# Patient Record
Sex: MALE | Race: OTHER | NOT HISPANIC OR LATINO | Employment: FULL TIME | URBAN - METROPOLITAN AREA
[De-identification: names, ages, dates, MRNs, and addresses within clinical notes are randomized per-mention and may not be internally consistent; named-entity substitution may affect disease eponyms.]

---

## 2021-08-02 PROCEDURE — 99284 EMERGENCY DEPT VISIT MOD MDM: CPT

## 2021-08-03 ENCOUNTER — HOSPITAL ENCOUNTER (EMERGENCY)
Facility: HOSPITAL | Age: 36
Discharge: HOME | End: 2021-08-03
Attending: EMERGENCY MEDICINE
Payer: COMMERCIAL

## 2021-08-03 VITALS
SYSTOLIC BLOOD PRESSURE: 127 MMHG | WEIGHT: 145 LBS | DIASTOLIC BLOOD PRESSURE: 77 MMHG | HEART RATE: 75 BPM | BODY MASS INDEX: 22.76 KG/M2 | RESPIRATION RATE: 18 BRPM | OXYGEN SATURATION: 99 % | TEMPERATURE: 98.4 F | HEIGHT: 67 IN

## 2021-08-03 DIAGNOSIS — R14.0 BLOATING: Primary | ICD-10-CM

## 2021-08-03 DIAGNOSIS — R19.5 DARK STOOLS: ICD-10-CM

## 2021-08-03 DIAGNOSIS — K92.1 BLOOD IN STOOL: ICD-10-CM

## 2021-08-03 LAB
ALBUMIN SERPL BCP-MCNC: 4.4 G/DL (ref 3.5–5)
ALP SERPL-CCNC: 79 U/L (ref 46–116)
ALT SERPL W P-5'-P-CCNC: 27 U/L (ref 12–78)
ANION GAP SERPL CALCULATED.3IONS-SCNC: 11 MMOL/L (ref 4–13)
AST SERPL W P-5'-P-CCNC: 16 U/L (ref 5–45)
BASOPHILS # BLD AUTO: 0.08 THOUSANDS/ΜL (ref 0–0.1)
BASOPHILS NFR BLD AUTO: 1 % (ref 0–1)
BILIRUB SERPL-MCNC: 0.48 MG/DL (ref 0.2–1)
BILIRUB UR QL STRIP: NEGATIVE
BUN SERPL-MCNC: 13 MG/DL (ref 5–25)
CALCIUM SERPL-MCNC: 8.8 MG/DL (ref 8.3–10.1)
CHLORIDE SERPL-SCNC: 104 MMOL/L (ref 100–108)
CLARITY UR: CLEAR
CO2 SERPL-SCNC: 29 MMOL/L (ref 21–32)
COLOR UR: YELLOW
CREAT SERPL-MCNC: 0.8 MG/DL (ref 0.6–1.3)
EOSINOPHIL # BLD AUTO: 0.3 THOUSAND/ΜL (ref 0–0.61)
EOSINOPHIL NFR BLD AUTO: 3 % (ref 0–6)
ERYTHROCYTE [DISTWIDTH] IN BLOOD BY AUTOMATED COUNT: 12.6 % (ref 11.6–15.1)
GFR SERPL CREATININE-BSD FRML MDRD: 115 ML/MIN/1.73SQ M
GLUCOSE SERPL-MCNC: 100 MG/DL (ref 65–140)
GLUCOSE UR STRIP-MCNC: NEGATIVE MG/DL
HCT VFR BLD AUTO: 45.3 % (ref 36.5–49.3)
HGB BLD-MCNC: 15.1 G/DL (ref 12–17)
HGB UR QL STRIP.AUTO: NEGATIVE
IMM GRANULOCYTES # BLD AUTO: 0.03 THOUSAND/UL (ref 0–0.2)
IMM GRANULOCYTES NFR BLD AUTO: 0 % (ref 0–2)
KETONES UR STRIP-MCNC: NEGATIVE MG/DL
LEUKOCYTE ESTERASE UR QL STRIP: NEGATIVE
LIPASE SERPL-CCNC: 311 U/L (ref 73–393)
LYMPHOCYTES # BLD AUTO: 4.18 THOUSANDS/ΜL (ref 0.6–4.47)
LYMPHOCYTES NFR BLD AUTO: 36 % (ref 14–44)
MCH RBC QN AUTO: 28.9 PG (ref 26.8–34.3)
MCHC RBC AUTO-ENTMCNC: 33.3 G/DL (ref 31.4–37.4)
MCV RBC AUTO: 87 FL (ref 82–98)
MONOCYTES # BLD AUTO: 1.01 THOUSAND/ΜL (ref 0.17–1.22)
MONOCYTES NFR BLD AUTO: 9 % (ref 4–12)
NEUTROPHILS # BLD AUTO: 5.89 THOUSANDS/ΜL (ref 1.85–7.62)
NEUTS SEG NFR BLD AUTO: 51 % (ref 43–75)
NITRITE UR QL STRIP: NEGATIVE
NRBC BLD AUTO-RTO: 0 /100 WBCS
PH UR STRIP.AUTO: 6 [PH]
PLATELET # BLD AUTO: 284 THOUSANDS/UL (ref 149–390)
PMV BLD AUTO: 11.3 FL (ref 8.9–12.7)
POTASSIUM SERPL-SCNC: 3.8 MMOL/L (ref 3.5–5.3)
PROT SERPL-MCNC: 7.9 G/DL (ref 6.4–8.2)
PROT UR STRIP-MCNC: NEGATIVE MG/DL
RBC # BLD AUTO: 5.22 MILLION/UL (ref 3.88–5.62)
SODIUM SERPL-SCNC: 144 MMOL/L (ref 136–145)
SP GR UR STRIP.AUTO: 1.02 (ref 1–1.03)
UROBILINOGEN UR QL STRIP.AUTO: 0.2 E.U./DL
WBC # BLD AUTO: 11.49 THOUSAND/UL (ref 4.31–10.16)

## 2021-08-03 PROCEDURE — 36415 COLL VENOUS BLD VENIPUNCTURE: CPT | Performed by: EMERGENCY MEDICINE

## 2021-08-03 PROCEDURE — 96360 HYDRATION IV INFUSION INIT: CPT

## 2021-08-03 PROCEDURE — 80053 COMPREHEN METABOLIC PANEL: CPT | Performed by: EMERGENCY MEDICINE

## 2021-08-03 PROCEDURE — 85025 COMPLETE CBC W/AUTO DIFF WBC: CPT | Performed by: EMERGENCY MEDICINE

## 2021-08-03 PROCEDURE — 99284 EMERGENCY DEPT VISIT MOD MDM: CPT | Performed by: EMERGENCY MEDICINE

## 2021-08-03 PROCEDURE — 81003 URINALYSIS AUTO W/O SCOPE: CPT | Performed by: EMERGENCY MEDICINE

## 2021-08-03 PROCEDURE — 83690 ASSAY OF LIPASE: CPT | Performed by: EMERGENCY MEDICINE

## 2021-08-03 RX ADMIN — SODIUM CHLORIDE 1000 ML: 0.9 INJECTION, SOLUTION INTRAVENOUS at 01:53

## 2021-08-03 NOTE — Clinical Note
Jennifer Lydia was seen and treated in our emergency department on 8/2/2021  Diagnosis: blood in stool    [place]    He may return on this date: 08/05/2021         If you have any questions or concerns, please don't hesitate to call        Ben Strange MD    ______________________________           _______________          _______________  [facility] [place]                              Date                                Time

## 2021-08-03 NOTE — ED PROVIDER NOTES
History  Chief Complaint   Patient presents with    Abdominal Pain     left sided abd pain x 3 wks, diarrhea, black stools      HPI    39 yo M otherwise healthy presents to ed for eval blood in his stools  Started three weeks ago  Patient states this has never happened before  He denies abd pain  Patient states he had feeling of gas in his stomachache, specks of blood in stool intermittently  Started turning black three days ago  Stool is now only coming once per day, solid, and black  Eating pizza, regular foods per patient  He has some intermittent loss of appetite  No fevers  No lightheadedness  No n/v  No other complaints on ROS  Tried pepto three days and stools dark since then  None       No past medical history on file  No past surgical history on file  No family history on file  I have reviewed and agree with the history as documented  No existing history information found  No existing history information found  Social History     Tobacco Use    Smoking status: Not on file   Substance Use Topics    Alcohol use: Not on file    Drug use: Not on file       Review of Systems   Constitutional: Negative for chills, fatigue and fever  HENT: Negative for nosebleeds and sore throat  Eyes: Negative for redness and visual disturbance  Respiratory: Negative for shortness of breath and wheezing  Cardiovascular: Negative for chest pain and palpitations  Gastrointestinal: Positive for blood in stool and diarrhea  Negative for abdominal pain  Endocrine: Negative for polyuria  Genitourinary: Negative for difficulty urinating and testicular pain  Musculoskeletal: Negative for back pain and neck stiffness  Skin: Negative for rash and wound  Neurological: Negative for seizures, speech difficulty and headaches  Psychiatric/Behavioral: Negative for dysphoric mood and hallucinations  All other systems reviewed and are negative        Physical Exam  Physical Exam  Vitals and nursing note reviewed  Constitutional:       Appearance: He is well-developed  HENT:      Head: Normocephalic and atraumatic  Right Ear: External ear normal       Left Ear: External ear normal    Eyes:      Conjunctiva/sclera: Conjunctivae normal    Cardiovascular:      Rate and Rhythm: Normal rate and regular rhythm  Heart sounds: Normal heart sounds  Pulmonary:      Effort: Pulmonary effort is normal       Breath sounds: Normal breath sounds  No wheezing  Chest:      Chest wall: No tenderness  Abdominal:      General: Bowel sounds are normal       Palpations: Abdomen is soft  Tenderness: There is no abdominal tenderness  There is no guarding  Musculoskeletal:         General: Normal range of motion  Cervical back: Normal range of motion  Skin:     General: Skin is warm and dry  Findings: No rash  Neurological:      Mental Status: He is alert and oriented to person, place, and time  Cranial Nerves: No cranial nerve deficit  Sensory: No sensory deficit  Motor: No abnormal muscle tone        Coordination: Coordination normal          Vital Signs  ED Triage Vitals [08/02/21 2300]   Temperature Pulse Respirations Blood Pressure SpO2   98 4 °F (36 9 °C) 81 18 128/86 97 %      Temp Source Heart Rate Source Patient Position - Orthostatic VS BP Location FiO2 (%)   Oral Monitor Sitting Left arm --      Pain Score       --           Vitals:    08/02/21 2300 08/03/21 0313 08/03/21 0315   BP: 128/86  127/77   Pulse: 81 73 75   Patient Position - Orthostatic VS: Sitting           Visual Acuity      ED Medications  Medications   sodium chloride 0 9 % bolus 1,000 mL (0 mL Intravenous Stopped 8/3/21 0253)       Diagnostic Studies  Results Reviewed     Procedure Component Value Units Date/Time    Comprehensive metabolic panel [[id number]] Collected: 08/03/21 0151    Lab Status: Final result Specimen: Blood from Arm, Left Updated: 08/03/21 4875     Sodium 144 mmol/L      Potassium 3 8 mmol/L      Chloride 104 mmol/L      CO2 29 mmol/L      ANION GAP 11 mmol/L      BUN 13 mg/dL      Creatinine 0 80 mg/dL      Glucose 100 mg/dL      Calcium 8 8 mg/dL      AST 16 U/L      ALT 27 U/L      Alkaline Phosphatase 79 U/L      Total Protein 7 9 g/dL      Albumin 4 4 g/dL      Total Bilirubin 0 48 mg/dL      eGFR 115 ml/min/1 73sq m     Narrative:      National Kidney Disease Foundation guidelines for Chronic Kidney Disease (CKD):     Stage 1 with normal or high GFR (GFR > 90 mL/min/1 73 square meters)    Stage 2 Mild CKD (GFR = 60-89 mL/min/1 73 square meters)    Stage 3A Moderate CKD (GFR = 45-59 mL/min/1 73 square meters)    Stage 3B Moderate CKD (GFR = 30-44 mL/min/1 73 square meters)    Stage 4 Severe CKD (GFR = 15-29 mL/min/1 73 square meters)    Stage 5 End Stage CKD (GFR <15 mL/min/1 73 square meters)  Note: GFR calculation is accurate only with a steady state creatinine    Lipase [[id number]]  (Normal) Collected: 08/03/21 0151    Lab Status: Final result Specimen: Blood from Arm, Left Updated: 08/03/21 0213     Lipase 311 u/L     UA (URINE) with reflex to Scope [[id number]] Collected: 08/03/21 0151    Lab Status: Final result Specimen: Urine, Clean Catch Updated: 08/03/21 0200     Color, UA Yellow     Clarity, UA Clear     Specific Gravity, UA 1 025     pH, UA 6 0     Leukocytes, UA Negative     Nitrite, UA Negative     Protein, UA Negative mg/dl      Glucose, UA Negative mg/dl      Ketones, UA Negative mg/dl      Urobilinogen, UA 0 2 E U /dl      Bilirubin, UA Negative     Blood, UA Negative    CBC and differential [[id number]]  (Abnormal) Collected: 08/03/21 0151    Lab Status: Final result Specimen: Blood from Arm, Left Updated: 08/03/21 0159     WBC 11 49 Thousand/uL      RBC 5 22 Million/uL      Hemoglobin 15 1 g/dL      Hematocrit 45 3 %      MCV 87 fL      MCH 28 9 pg      MCHC 33 3 g/dL      RDW 12 6 %      MPV 11 3 fL      Platelets 948 Thousands/uL      nRBC 0 /100 WBCs Neutrophils Relative 51 %      Immat GRANS % 0 %      Lymphocytes Relative 36 %      Monocytes Relative 9 %      Eosinophils Relative 3 %      Basophils Relative 1 %      Neutrophils Absolute 5 89 Thousands/µL      Immature Grans Absolute 0 03 Thousand/uL      Lymphocytes Absolute 4 18 Thousands/µL      Monocytes Absolute 1 01 Thousand/µL      Eosinophils Absolute 0 30 Thousand/µL      Basophils Absolute 0 08 Thousands/µL                  No orders to display              Procedures  Procedures         ED Course               MDM    27-year-old male who presents to the ED for evaluation of possible blood in his stool  The patient has no blood currently, previous had diarrhea, now resolved, the patient going once per day  The patient has no abdominal pain  CBC within normal limits, signed out pending electrolytes and lipase  Electrolytes within normal limits, on chart review, discharged home with gi and pcp follow up  The patient was instructed to follow up as documented  Strict return precautions were discussed with the patient and the patient was instructed to return to the emergency department immediately if symptoms worsen  The patient/patient family member acknowledged and were in agreement with plan  Disposition  Final diagnoses:   Bloating   Blood in stool   Dark stools     Time reflects when diagnosis was documented in both MDM as applicable and the Disposition within this note     Time User Action Codes Description Comment    8/3/2021  2:06 AM Roanne Shawn Add [R14 0] Bloating     8/3/2021  2:06 AM Roanne Shawn Add [K92 1] Blood in stool     8/3/2021  2:06 AM Roanne Shawn Add [R19 5] Dark stools       ED Disposition     ED Disposition Condition Date/Time Comment    Discharge Stable Tue Aug 3, 2021  2:06 AM Carlos Gil discharge to home/self care              Follow-up Information     Follow up With Specialties Details Why Contact Info Additional Kiana Demarco MD Internal Medicine Schedule an appointment as soon as possible for a visit  To find a primary care doctor, For follow up regarding your symptoms and recheck [address] [facility] Schedule an appointment as soon as possible for a visit in 1 day For follow up regarding your symptoms and recheck [address] [place] [phone] [address] [facility], [address] [facility], [facility], [place], [place], [address] [place] [address]          There are no discharge medications for this patient  No discharge procedures on file      PDMP Review     None          ED Provider  Electronically Signed by           Esteban Andre MD  08/03/21 4635

## 2021-08-25 ENCOUNTER — OFFICE VISIT (OUTPATIENT)
Dept: GASTROENTEROLOGY | Facility: CLINIC | Age: 36
End: 2021-08-25
Payer: COMMERCIAL

## 2021-08-25 VITALS
HEART RATE: 86 BPM | HEIGHT: 67 IN | BODY MASS INDEX: 24.8 KG/M2 | DIASTOLIC BLOOD PRESSURE: 68 MMHG | WEIGHT: 158 LBS | SYSTOLIC BLOOD PRESSURE: 102 MMHG

## 2021-08-25 DIAGNOSIS — K62.5 RECTAL BLEEDING: Primary | ICD-10-CM

## 2021-08-25 PROCEDURE — 99204 OFFICE O/P NEW MOD 45 MIN: CPT | Performed by: PHYSICIAN ASSISTANT

## 2021-08-25 NOTE — PROGRESS NOTES
[facility] - Outpatient Consultation  Brandyn Davila 38 y o  male MRN: [id number]  Encounter: [id number]          ASSESSMENT AND PLAN:      1  Rectal bleeding    Patient reports of intermittent rectal bleeding this month  He also reports of a few weeks of diarrhea and left sided abdominal bloating which has resolved  Will plan for colonoscopy to investigate   ______________________________________________________________________    HPI:  Patient is a 38year old male who presents to the office for an evaluation of rectal bleeding  Patient reports noticing intermittent rectal bleeding this month  He also reports he developed diarrhea and left sided abdominal bloating at the beginning of this month which lasted almost 3 weeks and has resolved  No nausea or vomiting  No sick contacts with similar symptoms  He denies black stools with the exception of black stools when he took Pepto bismuth for his diarrhea  He has never had a colonoscopy  No family history of ulcerative colitis, crohn's disease, or colon cancer  REVIEW OF SYSTEMS:    CONSTITUTIONAL: Denies any fever, chills, rigors  HEENT: No earache or tinnitus  Denies hearing loss or visual disturbances  CARDIOVASCULAR: No chest pain or palpitations  RESPIRATORY: Denies any cough, hemoptysis, shortness of breath or dyspnea on exertion  GASTROINTESTINAL: As noted in the History of Present Illness  GENITOURINARY: No problems with urination  Denies any hematuria or dysuria  NEUROLOGIC: No dizziness or vertigo, denies headaches  MUSCULOSKELETAL: Denies any muscle or joint pain  SKIN: Denies skin rashes or itching  ENDOCRINE: Denies excessive thirst  Denies intolerance to heat or cold  PSYCHOSOCIAL: Denies depression or anxiety  Denies any recent memory loss  Historical Information   History reviewed  No pertinent past medical history  History reviewed  No pertinent surgical history    Social History Social History     Substance and Sexual Activity   Alcohol Use Not Currently    Comment: daily     Social History     Substance and Sexual Activity   Drug Use Yes    Comment: CBD oils     Social History     Tobacco Use   Smoking Status Current Every Day Smoker   Smokeless Tobacco Never Used     History reviewed  No pertinent family history  Meds/Allergies     No current outpatient medications on file  No Known Allergies        Objective     Blood pressure 102/68, pulse 86, height 5' 7" (1 702 m), weight 71 7 kg (158 lb)  Body mass index is 24 75 kg/m²  PHYSICAL EXAM:      General Appearance:   Alert, cooperative, no distress   HEENT:   Normocephalic, atraumatic, anicteric    Neck:  Supple, symmetrical, trachea midline   Lungs:   Clear to auscultation bilaterally; no rales, rhonchi or wheezing; respirations unlabored    Heart[de-identified]   Regular rate and rhythm; no murmur, rub, or gallop  Abdomen:   Soft, non-tender, non-distended; normal bowel sounds; no masses, no organomegaly    Genitalia:   Deferred    Rectal:   Deferred    Extremities:  No cyanosis, clubbing or edema    Pulses:  2+ and symmetric    Skin:  No jaundice, rashes, or lesions    Lymph nodes:  No palpable cervical lymphadenopathy        Lab Results:   No visits with results within 1 Day(s) from this visit     Latest known visit with results is:   Admission on 08/03/2021, Discharged on 08/03/2021   Component Date Value    WBC 08/03/2021 11 49*    RBC 08/03/2021 5 22     Hemoglobin 08/03/2021 15 1     Hematocrit 08/03/2021 45 3     MCV 08/03/2021 87     MCH 08/03/2021 28 9     MCHC 08/03/2021 33 3     RDW 08/03/2021 12 6     MPV 08/03/2021 11 3     Platelets 89/60/7803 284     nRBC 08/03/2021 0     Neutrophils Relative 08/03/2021 51     Immat GRANS % 08/03/2021 0     Lymphocytes Relative 08/03/2021 36     Monocytes Relative 08/03/2021 9     Eosinophils Relative 08/03/2021 3     Basophils Relative 08/03/2021 1     Neutrophils Absolute 08/03/2021 5 89     Immature Grans Absolute 08/03/2021 0 03     Lymphocytes Absolute 08/03/2021 4 18     Monocytes Absolute 08/03/2021 1 01     Eosinophils Absolute 08/03/2021 0 30     Basophils Absolute 08/03/2021 0 08     Sodium 08/03/2021 144     Potassium 08/03/2021 3 8     Chloride 08/03/2021 104     CO2 08/03/2021 29     ANION GAP 08/03/2021 11     BUN 08/03/2021 13     Creatinine 08/03/2021 0 80     Glucose 08/03/2021 100     Calcium 08/03/2021 8 8     AST 08/03/2021 16     ALT 08/03/2021 27     Alkaline Phosphatase 08/03/2021 79     Total Protein 08/03/2021 7 9     Albumin 08/03/2021 4 4     Total Bilirubin 08/03/2021 0 48     eGFR 08/03/2021 115     Color, UA 08/03/2021 Yellow     Clarity, UA 08/03/2021 Clear     Specific Gravity, UA 08/03/2021 1 025     pH, UA 08/03/2021 6 0     Leukocytes, UA 08/03/2021 Negative     Nitrite, UA 08/03/2021 Negative     Protein, UA 08/03/2021 Negative     Glucose, UA 08/03/2021 Negative     Ketones, UA 08/03/2021 Negative     Urobilinogen, UA 08/03/2021 0 2     Bilirubin, UA 08/03/2021 Negative     Blood, UA 08/03/2021 Negative     Lipase 08/03/2021 311          Radiology Results:   No results found

## 2021-10-01 ENCOUNTER — TELEPHONE (OUTPATIENT)
Dept: GASTROENTEROLOGY | Facility: HOSPITAL | Age: 36
End: 2021-10-01

## 2022-11-10 ENCOUNTER — HOSPITAL ENCOUNTER (EMERGENCY)
Facility: HOSPITAL | Age: 37
Discharge: HOME | End: 2022-11-10
Attending: EMERGENCY MEDICINE

## 2022-11-10 ENCOUNTER — APPOINTMENT (OUTPATIENT)
Dept: RADIOLOGY | Facility: HOSPITAL | Age: 37
End: 2022-11-10

## 2022-11-10 VITALS
RESPIRATION RATE: 18 BRPM | OXYGEN SATURATION: 98 % | TEMPERATURE: 98 F | HEART RATE: 73 BPM | SYSTOLIC BLOOD PRESSURE: 142 MMHG | DIASTOLIC BLOOD PRESSURE: 94 MMHG

## 2022-11-10 DIAGNOSIS — R07.9 CHEST PAIN: Primary | ICD-10-CM

## 2022-11-10 LAB
ALBUMIN SERPL BCP-MCNC: 4.4 G/DL (ref 3.5–5)
ALP SERPL-CCNC: 87 U/L (ref 46–116)
ALT SERPL W P-5'-P-CCNC: 31 U/L (ref 12–78)
ANION GAP SERPL CALCULATED.3IONS-SCNC: 10 MMOL/L (ref 4–13)
AST SERPL W P-5'-P-CCNC: 23 U/L (ref 5–45)
ATRIAL RATE: 88 BPM
BASOPHILS # BLD AUTO: 0.07 THOUSANDS/ÂΜL (ref 0–0.1)
BASOPHILS NFR BLD AUTO: 1 % (ref 0–1)
BILIRUB SERPL-MCNC: 0.6 MG/DL (ref 0.2–1)
BUN SERPL-MCNC: 13 MG/DL (ref 5–25)
CALCIUM SERPL-MCNC: 9.4 MG/DL (ref 8.3–10.1)
CARDIAC TROPONIN I PNL SERPL HS: 2 NG/L
CHLORIDE SERPL-SCNC: 106 MMOL/L (ref 96–108)
CO2 SERPL-SCNC: 24 MMOL/L (ref 21–32)
CREAT SERPL-MCNC: 0.77 MG/DL (ref 0.6–1.3)
EOSINOPHIL # BLD AUTO: 0.1 THOUSAND/ÂΜL (ref 0–0.61)
EOSINOPHIL NFR BLD AUTO: 1 % (ref 0–6)
ERYTHROCYTE [DISTWIDTH] IN BLOOD BY AUTOMATED COUNT: 12.5 % (ref 11.6–15.1)
FLUAV RNA RESP QL NAA+PROBE: NEGATIVE
FLUBV RNA RESP QL NAA+PROBE: NEGATIVE
GFR SERPL CREATININE-BSD FRML MDRD: 115 ML/MIN/1.73SQ M
GLUCOSE SERPL-MCNC: 128 MG/DL (ref 65–140)
HCT VFR BLD AUTO: 44.9 % (ref 36.5–49.3)
HGB BLD-MCNC: 15.3 G/DL (ref 12–17)
IMM GRANULOCYTES # BLD AUTO: 0.04 THOUSAND/UL (ref 0–0.2)
IMM GRANULOCYTES NFR BLD AUTO: 0 % (ref 0–2)
LYMPHOCYTES # BLD AUTO: 2.7 THOUSANDS/ÂΜL (ref 0.6–4.47)
LYMPHOCYTES NFR BLD AUTO: 24 % (ref 14–44)
MCH RBC QN AUTO: 29.7 PG (ref 26.8–34.3)
MCHC RBC AUTO-ENTMCNC: 34.1 G/DL (ref 31.4–37.4)
MCV RBC AUTO: 87 FL (ref 82–98)
MONOCYTES # BLD AUTO: 0.8 THOUSAND/ÂΜL (ref 0.17–1.22)
MONOCYTES NFR BLD AUTO: 7 % (ref 4–12)
NEUTROPHILS # BLD AUTO: 7.49 THOUSANDS/ÂΜL (ref 1.85–7.62)
NEUTS SEG NFR BLD AUTO: 67 % (ref 43–75)
NRBC BLD AUTO-RTO: 0 /100 WBCS
P AXIS: 117 DEGREES
PLATELET # BLD AUTO: 305 THOUSANDS/UL (ref 149–390)
PMV BLD AUTO: 11.7 FL (ref 8.9–12.7)
POTASSIUM SERPL-SCNC: 3.5 MMOL/L (ref 3.5–5.3)
PR INTERVAL: 156 MS
PROT SERPL-MCNC: 8 G/DL (ref 6.4–8.4)
QRS AXIS: 83 DEGREES
QRSD INTERVAL: 116 MS
QT INTERVAL: 372 MS
QTC INTERVAL: 450 MS
RBC # BLD AUTO: 5.16 MILLION/UL (ref 3.88–5.62)
RSV RNA RESP QL NAA+PROBE: NEGATIVE
SARS-COV-2 RNA RESP QL NAA+PROBE: NEGATIVE
SODIUM SERPL-SCNC: 140 MMOL/L (ref 135–147)
T WAVE AXIS: 156 DEGREES
VENTRICULAR RATE: 88 BPM
WBC # BLD AUTO: 11.2 THOUSAND/UL (ref 4.31–10.16)

## 2022-11-18 NOTE — ED PROVIDER NOTES
History  Chief Complaint   Patient presents with   • Chest Pain     L sided chest pain/pressure x2 wks, sob      42-year-old male patient here for chest pain  Pain began 2 weeks ago  It is intermittent  Sporadic  Not brought on by exertion  Comes and goes within a few minutes to few hours  Associate with shortness of breath  No lightheadedness dizziness nausea vomiting  No diaphoresis  No history of ACS or CAD  No recent travels traumas or surgeries  No leg pain or swelling  No hemoptysis  History provided by:  Patient   used: No    Chest Pain  Pain location:  Substernal area  Pain quality: tightness    Pain radiates to:  Does not radiate  Pain radiates to the back: no    Pain severity:  Moderate  Onset quality:  Gradual  Duration:  2 weeks  Timing:  Intermittent  Progression:  Waxing and waning  Chronicity:  New  Relieved by:  Nothing  Worsened by:  Nothing tried  Ineffective treatments:  None tried  Associated symptoms: shortness of breath    Associated symptoms: no abdominal pain, no back pain, no cough, no fever, no palpitations and not vomiting        None       No past medical history on file  No past surgical history on file  No family history on file  I have reviewed and agree with the history as documented  E-Cigarette/Vaping   • E-Cigarette Use Current Every Day User      E-Cigarette/Vaping Substances   • Nicotine Yes    • THC Yes    • CBD No    • Flavoring No    • Other No    • Unknown No      Social History     Tobacco Use   • Smoking status: Every Day   • Smokeless tobacco: Never   Vaping Use   • Vaping Use: Every day   • Substances: Nicotine, THC   Substance Use Topics   • Alcohol use: Not Currently     Comment: daily   • Drug use: Yes     Comment: CBD oils       Review of Systems   Constitutional: Negative for chills and fever  HENT: Negative for ear pain and sore throat  Eyes: Negative for pain and visual disturbance     Respiratory: Positive for shortness of breath  Negative for cough  Cardiovascular: Positive for chest pain  Negative for palpitations  Gastrointestinal: Negative for abdominal pain and vomiting  Genitourinary: Negative for dysuria and hematuria  Musculoskeletal: Negative for arthralgias and back pain  Skin: Negative for color change and rash  Neurological: Negative for seizures and syncope  All other systems reviewed and are negative  Physical Exam  Physical Exam  Vitals and nursing note reviewed  Constitutional:       General: He is not in acute distress  Appearance: He is well-developed  HENT:      Head: Normocephalic and atraumatic  Eyes:      Conjunctiva/sclera: Conjunctivae normal    Cardiovascular:      Rate and Rhythm: Normal rate and regular rhythm  Heart sounds: No murmur heard  Pulmonary:      Effort: Pulmonary effort is normal  No respiratory distress  Breath sounds: Normal breath sounds  Abdominal:      Palpations: Abdomen is soft  Tenderness: There is no abdominal tenderness  Musculoskeletal:         General: No swelling  Cervical back: Neck supple  Skin:     General: Skin is warm and dry  Capillary Refill: Capillary refill takes less than 2 seconds  Neurological:      Mental Status: He is alert     Psychiatric:         Mood and Affect: Mood normal          Vital Signs  ED Triage Vitals [11/10/22 1255]   Temperature Pulse Respirations Blood Pressure SpO2   98 °F (36 7 °C) 84 18 146/91 99 %      Temp src Heart Rate Source Patient Position - Orthostatic VS BP Location FiO2 (%)   -- -- -- -- --      Pain Score       --           Vitals:    11/10/22 1255 11/10/22 1542   BP: 146/91 142/94   Pulse: 84 73         Visual Acuity      ED Medications  Medications - No data to display    Diagnostic Studies  Results Reviewed     Procedure Component Value Units Date/Time    FLU/RSV/COVID - if FLU/RSV clinically relevant [[id number]]  (Normal) Collected: 11/10/22 1257    Lab Status: Final result Specimen: Nares from Nasopharyngeal Swab Updated: 11/10/22 1410     SARS-CoV-2 Negative     INFLUENZA A PCR Negative     INFLUENZA B PCR Negative     RSV PCR Negative    Narrative:      FOR PEDIATRIC PATIENTS - copy/paste COVID Guidelines URL to browser: [email or web address]    SARS-CoV-2 assay is a Nucleic Acid Amplification assay intended for the  qualitative detection of nucleic acid from SARS-CoV-2 in nasopharyngeal  swabs  Results are for the presumptive identification of SARS-CoV-2 RNA  Positive results are indicative of infection with SARS-CoV-2, the virus  causing COVID-19, but do not rule out bacterial infection or co-infection  with other viruses  Laboratories within the [place] and its  territories are required to report all positive results to the appropriate  public health authorities  Negative results do not preclude SARS-CoV-2  infection and should not be used as the sole basis for treatment or other  patient management decisions  Negative results must be combined with  clinical observations, patient history, and epidemiological information  This test has not been FDA cleared or approved  This test has been authorized by FDA under an Emergency Use Authorization  (EUA)  This test is only authorized for the duration of time the  declaration that circumstances exist justifying the authorization of the  emergency use of an in vitro diagnostic tests for detection of SARS-CoV-2  virus and/or diagnosis of COVID-19 infection under section 564(b)(1) of  the Act, 21 U  S C  225PZA-0(G)(0), unless the authorization is terminated  or revoked sooner  The test has been validated but independent review by FDA  and CLIA is pending  Test performed using [employer] Gene[employer]pert: This RT-PCR assay targets N2,  a region unique to SARS-CoV-2   A conserved region in the E-gene was chosen  for pan-Sarbecovirus detection which includes SARS-CoV-2  According to CMS-2020-01-R, this platform meets the definition of high-throughput technology      HS Troponin 0hr (reflex protocol) [[id number]]  (Normal) Collected: 11/10/22 1257    Lab Status: Final result Specimen: Blood from Arm, Left Updated: 11/10/22 1339     hs TnI 0hr 2 ng/L     CBC and differential [[id number]]  (Abnormal) Collected: 11/10/22 1257    Lab Status: Final result Specimen: Blood from Arm, Left Updated: 11/10/22 1335     WBC 11 20 Thousand/uL      RBC 5 16 Million/uL      Hemoglobin 15 3 g/dL      Hematocrit 44 9 %      MCV 87 fL      MCH 29 7 pg      MCHC 34 1 g/dL      RDW 12 5 %      MPV 11 7 fL      Platelets 966 Thousands/uL      nRBC 0 /100 WBCs      Neutrophils Relative 67 %      Immat GRANS % 0 %      Lymphocytes Relative 24 %      Monocytes Relative 7 %      Eosinophils Relative 1 %      Basophils Relative 1 %      Neutrophils Absolute 7 49 Thousands/µL      Immature Grans Absolute 0 04 Thousand/uL      Lymphocytes Absolute 2 70 Thousands/µL      Monocytes Absolute 0 80 Thousand/µL      Eosinophils Absolute 0 10 Thousand/µL      Basophils Absolute 0 07 Thousands/µL     Comprehensive metabolic panel [[id number]] Collected: 11/10/22 1257    Lab Status: Final result Specimen: Blood from Arm, Left Updated: 11/10/22 1333     Sodium 140 mmol/L      Potassium 3 5 mmol/L      Chloride 106 mmol/L      CO2 24 mmol/L      ANION GAP 10 mmol/L      BUN 13 mg/dL      Creatinine 0 77 mg/dL      Glucose 128 mg/dL      Calcium 9 4 mg/dL      AST 23 U/L      ALT 31 U/L      Alkaline Phosphatase 87 U/L      Total Protein 8 0 g/dL      Albumin 4 4 g/dL      Total Bilirubin 0 60 mg/dL      eGFR 115 ml/min/1 73sq m     Narrative:      Brian guidelines for Chronic Kidney Disease (CKD):   •  Stage 1 with normal or high GFR (GFR > 90 mL/min/1 73 square meters)  •  Stage 2 Mild CKD (GFR = 60-89 mL/min/1 73 square meters)  •  Stage 3A Moderate CKD (GFR = 45-59 mL/min/1 73 square meters)  •  Stage 3B Moderate CKD (GFR = 30-44 mL/min/1 73 square meters)  •  Stage 4 Severe CKD (GFR = 15-29 mL/min/1 73 square meters)  •  Stage 5 End Stage CKD (GFR <15 mL/min/1 73 square meters)  Note: GFR calculation is accurate only with a steady state creatinine                 XR chest 2 views   ED Interpretation by Virgil Joe PA-C (11/10 1527)   No acute cardiopulmonary abnormalities  Final Result by Ekaterina Cardenas MD (62/30 0543)      No acute cardiopulmonary disease  Workstation performed: [id number]                    Procedures  ECG 12 Lead Documentation Only    Date/Time: 11/17/2022 11:24 PM  Performed by: Virgil Joe PA-C  Authorized by: Virgil Joe PA-C     ECG reviewed by me, the ED Provider: yes    Patient location:  ED  Interpretation:     Interpretation: normal    Quality:     Tracing quality:  Limited by artifact  Rate:     ECG rate:  88    ECG rate assessment: normal    Rhythm:     Rhythm: sinus rhythm    Ectopy:     Ectopy: none    QRS:     QRS axis:  Normal    QRS intervals:  Normal  Conduction:     Conduction: abnormal      Abnormal conduction: incomplete RBBB    ST segments:     ST segments:  Normal  T waves:     T waves: normal               ED Course             HEART Risk Score    Flowsheet Row Most Recent Value   Heart Score Risk Calculator    History 0 Filed at: 11/17/2022 2327   ECG 1 Filed at: 11/17/2022 2327   Age 0 Filed at: 11/17/2022 2327   Risk Factors 1 Filed at: 11/17/2022 2327   Troponin 0 Filed at: 11/17/2022 2327   HEART Score 2 Filed at: 11/17/2022 2327                                      MDM  Number of Diagnoses or Management Options  Chest pain: new and requires workup  Diagnosis management comments: DDX including but not limited to: ACS, MI, PE, PTX, pneumonia, dissection, pleurisy, pericarditis, myocarditis, rhabdomyolysis, GI etiology  Plan:  Cardiac workup  Dispo pending         Amount and/or Complexity of Data Reviewed  Clinical lab tests: reviewed and ordered  Tests in the radiology section of CPT®: ordered and reviewed    Risk of Complications, Morbidity, and/or Mortality  Presenting problems: moderate  Management options: low  General comments: 24-year-old male patient with chest pain  Shortness of breath  Rule out PE with PERC criteria  Trop negative  Heart score 2  Pain atypical   Recommended close follow-up with family physician  Discussed return parameters  Patient understands and agrees with this plan  Patient Progress  Patient progress: stable      Disposition  Final diagnoses:   Chest pain     Time reflects when diagnosis was documented in both MDM as applicable and the Disposition within this note     Time User Action Codes Description Comment    11/10/2022  3:35 PM Viktor Viera Add [R07 9] Chest pain       ED Disposition     ED Disposition   Discharge    Condition   Stable    Date/Time   Thu Nov 10, 2022  3:35 PM    Comment   Brady Chrisney [id number] discharge to home/self care  Follow-up Information     Follow up With Specialties Details Why Betty Cutler MD Internal Medicine   [address] [place] [address] Drive [address] [place] [address]            There are no discharge medications for this patient  No discharge procedures on file      PDMP Review     None          ED Provider  Electronically Signed by           Zoran Valdes PA-C  11/17/22 4251

## 2025-03-14 ENCOUNTER — OCCMED (OUTPATIENT)
Age: 40
End: 2025-03-14

## 2025-03-14 DIAGNOSIS — Z02.1 PRE-EMPLOYMENT EXAMINATION: Primary | ICD-10-CM
